# Patient Record
Sex: FEMALE | Race: OTHER | NOT HISPANIC OR LATINO | Employment: UNEMPLOYED | URBAN - NONMETROPOLITAN AREA
[De-identification: names, ages, dates, MRNs, and addresses within clinical notes are randomized per-mention and may not be internally consistent; named-entity substitution may affect disease eponyms.]

---

## 2023-06-23 ENCOUNTER — OFFICE VISIT (OUTPATIENT)
Dept: URGENT CARE | Facility: CLINIC | Age: 33
End: 2023-06-23
Payer: COMMERCIAL

## 2023-06-23 VITALS
HEIGHT: 60 IN | RESPIRATION RATE: 18 BRPM | OXYGEN SATURATION: 99 % | TEMPERATURE: 98.6 F | HEART RATE: 70 BPM | DIASTOLIC BLOOD PRESSURE: 62 MMHG | WEIGHT: 121 LBS | BODY MASS INDEX: 23.75 KG/M2 | SYSTOLIC BLOOD PRESSURE: 110 MMHG

## 2023-06-23 DIAGNOSIS — Z02.4 DRIVER'S PERMIT PE (PHYSICAL EXAMINATION): Primary | ICD-10-CM

## 2023-06-23 PROCEDURE — G0380 LEV 1 HOSP TYPE B ED VISIT: HCPCS

## 2023-06-23 NOTE — PROGRESS NOTES
"  [facility]        NAME: Katharina Lesches is a 33 y o  female  : 1990    MRN: [id number]  DATE: 2023  TIME: 4:59 PM    Assessment and Plan   's permit PE (physical examination) [Z02 4]  1  's permit PE (physical examination)              Patient Instructions           Chief Complaint     Chief Complaint   Patient presents with   • Annual Exam     Drivers          History of Present Illness       50-year-old female here for 's permit physical   Family member translating for patient  Denies any medical history  Denies any daily medications  Denies any symptoms  Review of Systems   Review of Systems   Constitutional: Negative for chills and fever  HENT: Negative for ear pain and sore throat  Eyes: Negative for pain and visual disturbance  Respiratory: Negative for cough and shortness of breath  Cardiovascular: Negative for chest pain and palpitations  Gastrointestinal: Negative for abdominal pain and vomiting  Genitourinary: Negative for dysuria and hematuria  Musculoskeletal: Negative for arthralgias and back pain  Skin: Negative for color change and rash  Neurological: Negative for seizures and syncope  All other systems reviewed and are negative  Current Medications     No current outpatient medications on file  Current Allergies     Allergies as of 2023   • (No Known Allergies)            The following portions of the patient's history were reviewed and updated as appropriate: allergies, current medications, past family history, past medical history, past social history, past surgical history and problem list      History reviewed  No pertinent past medical history  History reviewed  No pertinent surgical history  History reviewed  No pertinent family history  Medications have been verified          Objective   /62   Pulse 70   Temp 98 6 °F (37 °C)   Resp 18   Ht 4' 11 5\" (1 511 m)   Wt 54 9 kg (121 lb)   " SpO2 99%   BMI 24 03 kg/m²        Physical Exam     Physical Exam  Constitutional:       General: She is not in acute distress  Appearance: Normal appearance  She is not ill-appearing  HENT:      Head: Normocephalic and atraumatic  Right Ear: Tympanic membrane, ear canal and external ear normal       Left Ear: Tympanic membrane, ear canal and external ear normal       Nose: No congestion  Mouth/Throat:      Mouth: Mucous membranes are moist       Pharynx: Oropharynx is clear  No oropharyngeal exudate or posterior oropharyngeal erythema  Eyes:      Extraocular Movements: Extraocular movements intact  Conjunctiva/sclera: Conjunctivae normal       Pupils: Pupils are equal, round, and reactive to light  Cardiovascular:      Rate and Rhythm: Normal rate and regular rhythm  Pulses: Normal pulses  Heart sounds: Normal heart sounds  No murmur heard  No friction rub  No gallop  Pulmonary:      Effort: Pulmonary effort is normal  No respiratory distress  Breath sounds: Normal breath sounds  No stridor  No wheezing, rhonchi or rales  Chest:      Chest wall: No tenderness  Abdominal:      General: Abdomen is flat  Bowel sounds are normal       Palpations: Abdomen is soft  Tenderness: There is no abdominal tenderness  There is no guarding or rebound  Musculoskeletal:         General: Normal range of motion  Cervical back: Normal range of motion and neck supple  No tenderness  Lymphadenopathy:      Cervical: No cervical adenopathy  Skin:     General: Skin is warm and dry  Capillary Refill: Capillary refill takes less than 2 seconds  Findings: No rash  Neurological:      General: No focal deficit present  Mental Status: She is alert and oriented to person, place, and time  Mental status is at baseline  Psychiatric:         Mood and Affect: Mood normal          Behavior: Behavior normal          Thought Content:  Thought content normal  Judgment: Judgment normal

## 2024-01-08 ENCOUNTER — OFFICE VISIT (OUTPATIENT)
Dept: URGENT CARE | Facility: MEDICAL CENTER | Age: 34
End: 2024-01-08
Payer: COMMERCIAL

## 2024-01-08 VITALS
WEIGHT: 127 LBS | OXYGEN SATURATION: 98 % | BODY MASS INDEX: 25.22 KG/M2 | HEART RATE: 96 BPM | SYSTOLIC BLOOD PRESSURE: 106 MMHG | DIASTOLIC BLOOD PRESSURE: 62 MMHG | RESPIRATION RATE: 20 BRPM | TEMPERATURE: 98.6 F

## 2024-01-08 DIAGNOSIS — R09.89 SUSPECTED NOVEL INFLUENZA A VIRUS INFECTION: Primary | ICD-10-CM

## 2024-01-08 LAB
SARS-COV-2 AG UPPER RESP QL IA: NEGATIVE
VALID CONTROL: NORMAL

## 2024-01-08 PROCEDURE — 99213 OFFICE O/P EST LOW 20 MIN: CPT | Performed by: STUDENT IN AN ORGANIZED HEALTH CARE EDUCATION/TRAINING PROGRAM

## 2024-01-08 PROCEDURE — 87811 SARS-COV-2 COVID19 W/OPTIC: CPT | Performed by: STUDENT IN AN ORGANIZED HEALTH CARE EDUCATION/TRAINING PROGRAM

## 2024-01-08 RX ORDER — BENZONATATE 200 MG/1
200 CAPSULE ORAL 3 TIMES DAILY PRN
Qty: 30 CAPSULE | Refills: 0 | Status: SHIPPED | OUTPATIENT
Start: 2024-01-08

## 2024-01-08 RX ORDER — GUAIFENESIN 600 MG/1
1200 TABLET, EXTENDED RELEASE ORAL 2 TIMES DAILY PRN
Qty: 40 TABLET | Refills: 0 | Status: SHIPPED | OUTPATIENT
Start: 2024-01-08 | End: 2024-01-18

## 2024-01-08 RX ORDER — FLUTICASONE PROPIONATE 50 MCG
1 SPRAY, SUSPENSION (ML) NASAL 2 TIMES DAILY
Qty: 16 G | Refills: 0 | Status: SHIPPED | OUTPATIENT
Start: 2024-01-08

## 2024-01-08 NOTE — PROGRESS NOTES
[facility]        NAME: Marv Charles is a 33 y.o. female  : 1990    MRN: [id number]    Assessment and Plan   Suspected novel influenza A virus infection [R09.89]  1. Suspected novel influenza A virus infection  Poct Covid 19 Rapid Antigen Test    guaiFENesin (MUCINEX) 600 mg 12 hr tablet    benzonatate (TESSALON) 200 MG capsule    fluticasone (FLONASE) 50 mcg/act nasal spray          Results for orders placed or performed in visit on 24   Poct Covid 19 Rapid Antigen Test   Result Value Ref Range    POCT SARS-CoV-2 Ag Negative Negative    VALID CONTROL Valid      Presenting with flulike symptoms.  Rapid COVID is negative.  Suspect flu a is likely etiology.  Discussed symptomatic and supportive measures.    Patient Instructions     See wrap up for details  Follow up with PCP in 3-5 days.  Proceed to  ER if symptoms worsen.    Chief Complaint     Chief Complaint   Patient presents with    Sore Throat     Started 3 days . Tylenol today. Painful all the time    Cough     Cough started Thursday. Muscles in upper back hurt from coughing so much    Fever    Generalized Body Aches    Sinus pressure     Feels like she has inflammation under her eyes         History of Present Illness       HPI    Friend is translating for patient.  Today is day 3 of symptoms  Reports sinus pressure, sore throat, productive cough, rhinorrhea, myalgias, eye redness, tearing  Tmax 39C  Denies nausea, vomiting, foreign body sensation in eyes, photosensitivity, pain with eye movement  Taking tylenol at home, vit C drink with mild improvement  Works in a factory  Denies sick contacts     Review of Systems   Review of Systems   Constitutional:  Positive for fever. Negative for chills.   HENT:  Positive for congestion, rhinorrhea, sinus pressure and sore throat. Negative for ear pain.    Eyes:  Negative for pain and visual disturbance.   Respiratory:  Positive for cough. Negative for chest tightness and shortness of breath.     Cardiovascular:  Negative for chest pain and palpitations.   Gastrointestinal:  Negative for abdominal pain, constipation, diarrhea, nausea and vomiting.   Genitourinary:  Negative for dysuria, hematuria and menstrual problem.   Musculoskeletal:  Positive for myalgias. Negative for arthralgias and back pain.   Skin:  Negative for color change and rash.   Neurological:  Negative for seizures and syncope.   Psychiatric/Behavioral:  Negative for dysphoric mood and suicidal ideas.    All other systems reviewed and are negative.    Current Medications       Current Outpatient Medications:     benzonatate (TESSALON) 200 MG capsule, Take 1 capsule (200 mg total) by mouth 3 (three) times a day as needed for cough, Disp: 30 capsule, Rfl: 0    fluticasone (FLONASE) 50 mcg/act nasal spray, 1 spray into each nostril 2 (two) times a day Daria semana, Disp: 16 g, Rfl: 0    guaiFENesin (MUCINEX) 600 mg 12 hr tablet, Take 2 tablets (1,200 mg total) by mouth 2 (two) times a day as needed for cough for up to 10 days, Disp: 40 tablet, Rfl: 0    Current Allergies     Allergies as of 01/08/2024    (No Known Allergies)            The following portions of the patient's history were reviewed and updated as appropriate: allergies, current medications, past family history, past medical history, past social history, past surgical history and problem list.     History reviewed. No pertinent past medical history.    History reviewed. No pertinent surgical history.    History reviewed. No pertinent family history.      Medications have been verified.        Objective   /62   Pulse 96   Temp 98.6 °F (37 °C)   Resp 20   Wt 57.6 kg (127 lb)   SpO2 98%   BMI 25.22 kg/m²        Physical Exam     Physical Exam  Constitutional:       General: She is not in acute distress.     Appearance: Normal appearance.   HENT:      Head: Normocephalic and atraumatic.      Right Ear: Ear canal normal. A middle ear effusion is present. Tympanic membrane  is not erythematous or bulging.      Left Ear: Ear canal normal. A middle ear effusion is present. Tympanic membrane is not erythematous or bulging.      Nose: Congestion present.      Right Sinus: Maxillary sinus tenderness and frontal sinus tenderness present.      Left Sinus: Maxillary sinus tenderness and frontal sinus tenderness present.      Mouth/Throat:      Mouth: Mucous membranes are moist.      Pharynx: Oropharynx is clear. No posterior oropharyngeal erythema.      Tonsils: No tonsillar exudate or tonsillar abscesses.   Eyes:      General: Lids are normal.         Right eye: No discharge.         Left eye: No discharge.      Extraocular Movements: Extraocular movements intact.      Conjunctiva/sclera: Conjunctivae normal.      Right eye: Right conjunctiva is not injected.      Left eye: Left conjunctiva is not injected.      Pupils: Pupils are equal, round, and reactive to light.   Cardiovascular:      Rate and Rhythm: Normal rate and regular rhythm.   Pulmonary:      Effort: Pulmonary effort is normal. No respiratory distress.      Breath sounds: Normal breath sounds.   Musculoskeletal:      Cervical back: Normal range of motion.   Skin:     General: Skin is warm and dry.   Neurological:      General: No focal deficit present.      Mental Status: She is alert and oriented to person, place, and time.   Psychiatric:         Mood and Affect: Mood normal.         Behavior: Behavior normal.

## 2024-01-08 NOTE — LETTER
January 8, 2024     Patient: Marv Charles   YOB: 1990   Date of Visit: 1/8/2024       To Whom it May Concern:    Marv Charles was seen in my clinic on 1/8/2024. She may return to school/work on 1/11/24 as long as she is 24 hours without fever without help of fever reducing medication.       If you have any questions or concerns, please don't hesitate to call.         Sincerely,          Jazzy Jones, DO        CC: No Recipients

## 2024-01-31 ENCOUNTER — TELEPHONE (OUTPATIENT)
Dept: URGENT CARE | Facility: MEDICAL CENTER | Age: 34
End: 2024-01-31

## 2024-01-31 NOTE — TELEPHONE ENCOUNTER
Patient came in requesting new work note, as work would not accept the previously provided note which states she could return on 1/11 provided she is fever free without fever reducing medicines. She reports she did not return until Monday 1/15 due to fever. New note provided with amended date of return.

## 2024-03-16 NOTE — LETTER
January 31, 2024     Patient: Marv Charles   YOB: 1990   Date of Visit: 1/31/2024       To Whom it May Concern:    Marv Charles was seen in my clinic on 01/08/2024. She may return to work on 01/15/2024 .    If you have any questions or concerns, please don't hesitate to call.         Sincerely,          DOLLY Brock        CC: Jazzy Jones,    Patient presents to ED with c/o 2 vomiting episodes. Started on Monday. 1 episode of vomiting Monday, ok Tuesday, episode of vomiting on Wednesday, ok Thursday, woke up this evening grabbing her abdomen and inconsolable. Pt is somewhat back to baseline at this time per dad.